# Patient Record
Sex: FEMALE | Race: WHITE
[De-identification: names, ages, dates, MRNs, and addresses within clinical notes are randomized per-mention and may not be internally consistent; named-entity substitution may affect disease eponyms.]

---

## 2022-09-20 PROBLEM — Z00.00 ENCOUNTER FOR PREVENTIVE HEALTH EXAMINATION: Status: ACTIVE | Noted: 2022-09-20

## 2022-11-28 ENCOUNTER — APPOINTMENT (OUTPATIENT)
Dept: CARDIOLOGY | Facility: CLINIC | Age: 62
End: 2022-11-28

## 2022-11-28 VITALS
DIASTOLIC BLOOD PRESSURE: 70 MMHG | SYSTOLIC BLOOD PRESSURE: 118 MMHG | WEIGHT: 142 LBS | HEART RATE: 77 BPM | BODY MASS INDEX: 24.85 KG/M2 | TEMPERATURE: 96 F | HEIGHT: 63.5 IN

## 2022-11-28 DIAGNOSIS — Z84.89 FAMILY HISTORY OF OTHER SPECIFIED CONDITIONS: ICD-10-CM

## 2022-11-28 PROCEDURE — 93000 ELECTROCARDIOGRAM COMPLETE: CPT

## 2022-11-28 PROCEDURE — 99204 OFFICE O/P NEW MOD 45 MIN: CPT | Mod: 25

## 2022-11-28 RX ORDER — METOPROLOL TARTRATE 50 MG/1
50 TABLET, FILM COATED ORAL
Qty: 2 | Refills: 0 | Status: ACTIVE | COMMUNITY
Start: 2022-11-28 | End: 1900-01-01

## 2022-12-03 NOTE — PHYSICAL EXAM
[de-identified] : well appearing, no distress [de-identified] : reg, nL s1/s2, no m/r/g [de-identified] : CTA [de-identified] : alert, normal affect, logical conversation

## 2022-12-03 NOTE — ASSESSMENT
[FreeTextEntry1] : Senior female with several cardiac risk factors / strong family history of CAD.\par \par Palpitations likely extrasystoles.\par \par Mild hyperlipidemia (untreated).

## 2022-12-03 NOTE — HISTORY OF PRESENT ILLNESS
[FreeTextEntry1] : 62 year-old female reestablishing care (previously Dr. Mathews).\par \par No cardiac history.\par Risk factors include HLD, pre-DM, and family history.\par Last cardiac testing > 5-years ago.\par \par Longstanding regular exercise.  Now 3-4 times / week.  Alternates cardio (30-m walk / bike) and light weights.  no exertional symptoms.\par \par No chest pain.  Breathing comfortable.\par \par Random palpitations.  Brief.  No lightheadedness / syncope.\par \par Labs Reviewed (8/2/22):\par   HDL 58  \par A1c 5.9\par CMP unremarkable\par \par PMH / PSH:\par HLD\par GERD\par \par DCIS s/p bilateral mastectomy\par Knee surgery\par \par SOCIAL:\par Remote social smoker\par \par FAMILY\par Father, brother, sister (premature CAD / CABG)

## 2022-12-06 RX ORDER — PREDNISONE 20 MG/1
20 TABLET ORAL
Qty: 9 | Refills: 0 | Status: ACTIVE | COMMUNITY
Start: 2022-12-06 | End: 1900-01-01

## 2022-12-15 ENCOUNTER — APPOINTMENT (OUTPATIENT)
Dept: CARDIOLOGY | Facility: CLINIC | Age: 62
End: 2022-12-15

## 2022-12-15 PROCEDURE — 93306 TTE W/DOPPLER COMPLETE: CPT

## 2023-01-13 ENCOUNTER — APPOINTMENT (OUTPATIENT)
Dept: CARDIOLOGY | Facility: CLINIC | Age: 63
End: 2023-01-13
Payer: COMMERCIAL

## 2023-01-13 PROCEDURE — 93880 EXTRACRANIAL BILAT STUDY: CPT

## 2023-01-27 ENCOUNTER — APPOINTMENT (OUTPATIENT)
Dept: CARDIOLOGY | Facility: CLINIC | Age: 63
End: 2023-01-27
Payer: COMMERCIAL

## 2023-01-27 ENCOUNTER — TRANSCRIPTION ENCOUNTER (OUTPATIENT)
Age: 63
End: 2023-01-27

## 2023-01-27 VITALS
SYSTOLIC BLOOD PRESSURE: 108 MMHG | WEIGHT: 144 LBS | BODY MASS INDEX: 25.2 KG/M2 | DIASTOLIC BLOOD PRESSURE: 70 MMHG | HEART RATE: 69 BPM | TEMPERATURE: 96 F | HEIGHT: 63.5 IN

## 2023-01-27 DIAGNOSIS — R00.2 PALPITATIONS: ICD-10-CM

## 2023-01-27 DIAGNOSIS — E78.5 HYPERLIPIDEMIA, UNSPECIFIED: ICD-10-CM

## 2023-01-27 PROCEDURE — 99213 OFFICE O/P EST LOW 20 MIN: CPT

## 2023-02-13 PROBLEM — R00.2 PALPITATIONS: Status: ACTIVE | Noted: 2022-11-28

## 2023-02-13 PROBLEM — E78.5 HYPERLIPIDEMIA: Status: ACTIVE | Noted: 2022-11-28

## 2023-02-13 NOTE — PHYSICAL EXAM
[de-identified] : well appearing, no distress [de-identified] : alert, normal affect, logical conversation

## 2023-02-13 NOTE — REASON FOR VISIT
[FreeTextEntry1] : Feel well.\par \par Continued regular exercise.  No exertional symptoms.\par \par Stable rare / mild palpitations.\par \par CCTA scheduled.\par \par ECHO (12/15/22): nL chambers / biventricular function.  No valve disease.\par Carotid US (1/2.23): Mild bilateral EMILY.

## 2023-02-13 NOTE — ASSESSMENT
[FreeTextEntry1] : Senior female with several cardiac risk factors / strong family history of CAD.\par \par Palpitations likely extrasystoles.\par \par Mild hyperlipidemia (untreated).\par \par ECHO (12/15/22): nL chambers / biventricular function.  No valve disease.\par Carotid US (1/2.23): Mild bilateral EMILY.

## 2023-02-15 ENCOUNTER — OUTPATIENT (OUTPATIENT)
Dept: OUTPATIENT SERVICES | Facility: HOSPITAL | Age: 63
LOS: 1 days | End: 2023-02-15
Payer: COMMERCIAL

## 2023-02-15 ENCOUNTER — RESULT REVIEW (OUTPATIENT)
Age: 63
End: 2023-02-15

## 2023-02-15 DIAGNOSIS — Z00.8 ENCOUNTER FOR OTHER GENERAL EXAMINATION: ICD-10-CM

## 2023-02-15 DIAGNOSIS — Z84.89 FAMILY HISTORY OF OTHER SPECIFIED CONDITIONS: ICD-10-CM

## 2023-02-15 DIAGNOSIS — R00.2 PALPITATIONS: ICD-10-CM

## 2023-02-15 PROCEDURE — 75574 CT ANGIO HRT W/3D IMAGE: CPT | Mod: 26

## 2023-02-15 PROCEDURE — 75574 CT ANGIO HRT W/3D IMAGE: CPT

## 2023-02-16 DIAGNOSIS — Z84.89 FAMILY HISTORY OF OTHER SPECIFIED CONDITIONS: ICD-10-CM

## 2023-02-16 DIAGNOSIS — R00.2 PALPITATIONS: ICD-10-CM

## 2023-02-24 ENCOUNTER — APPOINTMENT (OUTPATIENT)
Dept: CARDIOLOGY | Facility: CLINIC | Age: 63
End: 2023-02-24
Payer: COMMERCIAL

## 2023-02-24 DIAGNOSIS — Z13.6 ENCOUNTER FOR SCREENING FOR CARDIOVASCULAR DISORDERS: ICD-10-CM

## 2023-02-24 PROCEDURE — 99441: CPT

## 2023-03-03 PROBLEM — Z13.6 SCREENING FOR CARDIOVASCULAR CONDITION: Status: ACTIVE | Noted: 2022-12-03

## 2023-04-11 ENCOUNTER — NON-APPOINTMENT (OUTPATIENT)
Age: 63
End: 2023-04-11

## 2025-01-27 ENCOUNTER — APPOINTMENT (OUTPATIENT)
Dept: CARDIOLOGY | Facility: CLINIC | Age: 65
End: 2025-01-27
Payer: COMMERCIAL

## 2025-01-27 ENCOUNTER — NON-APPOINTMENT (OUTPATIENT)
Age: 65
End: 2025-01-27

## 2025-01-27 VITALS
BODY MASS INDEX: 25.46 KG/M2 | SYSTOLIC BLOOD PRESSURE: 122 MMHG | HEART RATE: 66 BPM | DIASTOLIC BLOOD PRESSURE: 80 MMHG | WEIGHT: 146 LBS

## 2025-01-27 DIAGNOSIS — R00.2 PALPITATIONS: ICD-10-CM

## 2025-01-27 DIAGNOSIS — Z13.6 ENCOUNTER FOR SCREENING FOR CARDIOVASCULAR DISORDERS: ICD-10-CM

## 2025-01-27 DIAGNOSIS — E78.5 HYPERLIPIDEMIA, UNSPECIFIED: ICD-10-CM

## 2025-01-27 PROCEDURE — 99214 OFFICE O/P EST MOD 30 MIN: CPT | Mod: 25

## 2025-01-27 PROCEDURE — 93000 ELECTROCARDIOGRAM COMPLETE: CPT

## 2025-01-27 RX ORDER — LEVALBUTEROL TARTRATE 45 UG/1
45 AEROSOL, METERED ORAL
Refills: 0 | Status: ACTIVE | COMMUNITY

## 2025-01-27 RX ORDER — PITAVASTATIN CALCIUM 2.09 MG/1
2 TABLET, FILM COATED ORAL
Qty: 90 | Refills: 1 | Status: ACTIVE | COMMUNITY
Start: 2025-01-27 | End: 1900-01-01

## 2025-01-27 RX ORDER — CLONAZEPAM 2 MG/1
TABLET ORAL
Refills: 0 | Status: ACTIVE | COMMUNITY

## 2025-01-27 RX ORDER — CETIRIZINE HCL 10 MG
10 TABLET ORAL
Refills: 0 | Status: ACTIVE | COMMUNITY

## 2025-05-02 ENCOUNTER — APPOINTMENT (OUTPATIENT)
Dept: CARDIOLOGY | Facility: CLINIC | Age: 65
End: 2025-05-02
Payer: COMMERCIAL

## 2025-05-02 DIAGNOSIS — E78.5 HYPERLIPIDEMIA, UNSPECIFIED: ICD-10-CM

## 2025-05-02 PROCEDURE — 99212 OFFICE O/P EST SF 10 MIN: CPT

## 2025-09-12 ENCOUNTER — APPOINTMENT (OUTPATIENT)
Dept: CARDIOLOGY | Facility: CLINIC | Age: 65
End: 2025-09-12
Payer: COMMERCIAL

## 2025-09-12 DIAGNOSIS — E78.5 HYPERLIPIDEMIA, UNSPECIFIED: ICD-10-CM

## 2025-09-12 PROCEDURE — 99212 OFFICE O/P EST SF 10 MIN: CPT | Mod: 93
